# Patient Record
Sex: FEMALE | Race: BLACK OR AFRICAN AMERICAN | Employment: PART TIME | URBAN - METROPOLITAN AREA
[De-identification: names, ages, dates, MRNs, and addresses within clinical notes are randomized per-mention and may not be internally consistent; named-entity substitution may affect disease eponyms.]

---

## 2018-12-06 ENCOUNTER — OFFICE VISIT (OUTPATIENT)
Dept: OBGYN CLINIC | Age: 38
End: 2018-12-06

## 2018-12-06 VITALS
RESPIRATION RATE: 20 BRPM | HEIGHT: 59 IN | SYSTOLIC BLOOD PRESSURE: 119 MMHG | HEART RATE: 77 BPM | BODY MASS INDEX: 26.61 KG/M2 | WEIGHT: 132 LBS | DIASTOLIC BLOOD PRESSURE: 66 MMHG

## 2018-12-06 DIAGNOSIS — B96.89 BV (BACTERIAL VAGINOSIS): ICD-10-CM

## 2018-12-06 DIAGNOSIS — Z01.411 ENCOUNTER FOR GYNECOLOGICAL EXAMINATION WITH ABNORMAL FINDING: Primary | ICD-10-CM

## 2018-12-06 DIAGNOSIS — N89.8 VAGINAL DISCHARGE: ICD-10-CM

## 2018-12-06 DIAGNOSIS — N76.0 BV (BACTERIAL VAGINOSIS): ICD-10-CM

## 2018-12-06 LAB — WET MOUNT POCT, WMPOCT: NORMAL

## 2018-12-06 RX ORDER — METRONIDAZOLE 500 MG/1
500 TABLET ORAL 2 TIMES DAILY
Qty: 14 TAB | Refills: 0 | Status: SHIPPED | OUTPATIENT
Start: 2018-12-06 | End: 2018-12-13

## 2018-12-06 NOTE — PROGRESS NOTES
Subjective:   41 y.o. female for Well Woman Check. Patient's last menstrual period was 11/13/2018. Social History: not sexually active. Pertinent past medical hstory: no history of HTN, DVT, CAD, DM, liver disease, migraines or smoking. Current Outpatient Medications   Medication Sig Dispense Refill    metroNIDAZOLE (FLAGYL) 500 mg tablet Take 1 Tab by mouth two (2) times a day for 7 days. 14 Tab 0     No Known Allergies  Past Medical History:   Diagnosis Date    Anemia     Depression     Headache     Hypertension      History reviewed. No pertinent surgical history. Family History   Problem Relation Age of Onset    Hypertension Mother     Arthritis-osteo Mother     Arthritis-osteo Father     Diabetes Father      Social History     Tobacco Use    Smoking status: Never Smoker   Substance Use Topics    Alcohol use: No        ROS:  Feeling well. No dyspnea or chest pain on exertion. No abdominal pain, change in bowel habits, black or bloody stools. No urinary tract symptoms. GYN ROS: normal menses, no abnormal bleeding or pelvic pain , no breast pain or new or enlarging lumps on self exam. She complains of an abnormal vaginal discharge. No neurological complaints. Objective:     Visit Vitals  /66   Pulse 77   Resp 20   Ht 4' 11\" (1.499 m)   Wt 132 lb (59.9 kg)   LMP 11/13/2018   BMI 26.66 kg/m²     The patient appears well, alert, oriented x 3, in no distress. ENT normal.  Neck supple. No adenopathy or thyromegaly. ELIDIA. Lungs are clear, good air entry, no wheezes, rhonchi or rales. S1 and S2 normal, no murmurs, regular rate and rhythm. Abdomen soft without tenderness, guarding, mass or organomegaly. Extremities show no edema, normal peripheral pulses. Neurological is normal, no focal findings.     BREAST EXAM: breasts appear normal, no suspicious masses, no skin or nipple changes or axillary nodes    PELVIC EXAM: normal external genitalia, vulva, vagina, cervix, uterus and adnexa, PAP: Pap smear done today, HPV test    Wet prep: positive clue cells, no yeast, no trich, minimal WBCs    Assessment/Plan:   well woman  Vaginal discharge, BV. Rx Flagyl. pap smear  return annually or prn  Plan of care discussed. Patient expressed understanding.

## 2018-12-07 LAB
HPV HIGH RISK, 507303: NEGATIVE
PAP IMAGE GUIDED, 8900296: NORMAL

## 2018-12-20 ENCOUNTER — TELEPHONE (OUTPATIENT)
Dept: OBGYN CLINIC | Age: 38
End: 2018-12-20

## 2019-09-10 ENCOUNTER — OFFICE VISIT (OUTPATIENT)
Dept: OBGYN CLINIC | Age: 39
End: 2019-09-10

## 2019-09-10 VITALS
WEIGHT: 158 LBS | SYSTOLIC BLOOD PRESSURE: 102 MMHG | HEART RATE: 70 BPM | BODY MASS INDEX: 31.85 KG/M2 | HEIGHT: 59 IN | DIASTOLIC BLOOD PRESSURE: 78 MMHG

## 2019-09-10 DIAGNOSIS — Z86.19 HISTORY OF PCR DNA POSITIVE FOR HSV1: Primary | ICD-10-CM

## 2019-09-10 RX ORDER — HYDROCORTISONE 25 MG/G
OINTMENT TOPICAL
Refills: 3 | COMMUNITY
Start: 2019-08-28 | End: 2022-02-08

## 2019-09-10 RX ORDER — LANOLIN ALCOHOL/MO/W.PET/CERES
325 CREAM (GRAM) TOPICAL
COMMUNITY
Start: 2018-08-23 | End: 2022-02-08

## 2019-09-10 RX ORDER — HYDROCORTISONE 1 %
CREAM (GRAM) TOPICAL
COMMUNITY
Start: 2017-06-17 | End: 2022-02-08

## 2019-09-10 RX ORDER — ASPIRIN 325 MG
50000 TABLET, DELAYED RELEASE (ENTERIC COATED) ORAL
COMMUNITY
Start: 2018-08-23 | End: 2022-02-08

## 2019-09-10 RX ORDER — VALACYCLOVIR HYDROCHLORIDE 1 G/1
1000 TABLET, FILM COATED ORAL DAILY
Qty: 90 TAB | Refills: 3 | Status: SHIPPED | OUTPATIENT
Start: 2019-09-10 | End: 2022-03-28 | Stop reason: SDUPTHER

## 2019-09-10 RX ORDER — VALACYCLOVIR HYDROCHLORIDE 1 G/1
1000 TABLET, FILM COATED ORAL DAILY
COMMUNITY
End: 2019-09-10 | Stop reason: SDUPTHER

## 2019-09-10 RX ORDER — HYDROCORTISONE 25 MG/G
OINTMENT TOPICAL
COMMUNITY
Start: 2018-11-13 | End: 2022-02-08 | Stop reason: SDUPTHER

## 2019-09-10 RX ORDER — SERTRALINE HYDROCHLORIDE 100 MG/1
100 TABLET, FILM COATED ORAL DAILY
Refills: 3 | COMMUNITY
Start: 2019-06-03 | End: 2022-02-08

## 2019-09-10 NOTE — PROGRESS NOTES
Patient presents only to request a refill of her Valtrex. She takes 1000 mg daily for suppression and states that she has no outbreaks if she stays on the medication. She has no complaints today.

## 2021-03-04 NOTE — TELEPHONE ENCOUNTER
Guillaume Washburn called because she received a letter in the mail about her pap results . Her appointment was December 6, 2018 . She wants to discuss her results . Please advise. Thank you . yes

## 2022-02-07 NOTE — PROGRESS NOTES
02/07/22          ICD-10-CM ICD-9-CM    1. Depression, unspecified depression type  F32. A 311 escitalopram oxalate (LEXAPRO) 5 mg tablet   2. 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 We will trial Lexapro 5 mg daily. Eczema Hytone cream ordered. Recheck in 3 months. Lab results and schedule of future lab studies reviewed with patient  Diagnostic and radiologic results and the schedule of future studies were reviewed with the patient  All questions were answered and understood. Subjective:   Marjan Mondragon is a 46 y.o. female has Previous stillbirth or demise, antepartum, H/O cervical incompetence, and ASCUS on Pap smear on their problem list..  Chief Complaint   Patient presents with    [facility]     HX of Depression     Medication Refill   Facial scaling. She has had almost lifelong scaling of the face and sometimes in the flexural areas of the arms. Treating the facial area eczema with Hytone cream and this is successful. No other aggravating or relieving factors. Depression  Patient occasionally feels down and a motivational although she is able to go to her job without difficulty. She occasionally smokes marijuana and this helps. No SI or HI admitted to. No med two years. Feels a little depresseion    Review of Systems   Constitutional: Negative for chills and fever. HENT: Negative for hearing loss. Eyes: Negative for blurred vision, discharge and redness. Respiratory: Negative for cough and shortness of breath. Cardiovascular: Negative for chest pain, palpitations, orthopnea and leg swelling. Gastrointestinal: Negative for abdominal pain, blood in stool, constipation, diarrhea and melena. Genitourinary: Negative for dysuria, frequency and urgency. Musculoskeletal: Negative for joint pain and myalgias.    Skin: Negative for rash. Neurological: Negative for dizziness, focal weakness and headaches. Psychiatric/Behavioral: Positive for depression. Negative for substance abuse and suicidal ideas. The patient is not nervous/anxious. Health Maintenance Due   Topic Date Due    Hepatitis C Screening  Never done    Depression Screen  Never done    COVID-19 Vaccine (1) Never done    DTaP/Tdap/Td series (1 - Tdap) Never done    Flu Vaccine (1) Never done     Marijuana use. Non-smoker nondrinker.  4 para 3 age 8, 10 and 7. First child stillborn. Current Outpatient Medications   Medication Sig    hydrocortisone (HYTONE) 2.5 % ointment Apply  to affected area.  valACYclovir (VALTREX) 1 gram tablet Take 1 Tab by mouth daily. No current facility-administered medications for this visit. No Known Allergies  has Previous stillbirth or demise, antepartum, H/O cervical incompetence, and ASCUS on Pap smear on their problem list.    No past surgical history on file. reports that she has never smoked. She has never used smokeless tobacco. She reports that she does not drink alcohol and does not use drugs. family history includes Diabetes in her father; Hypertension in her mother; OSTEOARTHRITIS in her father and mother. Visit Vitals  /80 (BP 1 Location: Left arm, BP Patient Position: Sitting, BP Cuff Size: Large adult)   Pulse 72   Temp 97.4 °F (36.3 °C) (Temporal)   Resp 18   Ht 4' 11\" (1.499 m)   Wt 153 lb (69.4 kg)   SpO2 98%   BMI 30.90 kg/m²       Physical Exam  Vitals and nursing note reviewed. Constitutional:       Appearance: She is well-developed. HENT:      Head: Normocephalic and atraumatic. Right Ear: External ear normal.      Left Ear: Tympanic membrane and external ear normal.      Nose: Nose normal.   Eyes:      Conjunctiva/sclera: Conjunctivae normal.      Pupils: Pupils are equal, round, and reactive to light. Cardiovascular:      Rate and Rhythm: Regular rhythm. Heart sounds: Normal heart sounds. Pulmonary:      Effort: Pulmonary effort is normal. No respiratory distress. Breath sounds: Normal breath sounds. No wheezing or rales. Chest:      Chest wall: No tenderness. Abdominal:      General: Bowel sounds are normal. There is no distension. Palpations: Abdomen is soft. There is no mass. Tenderness: There is no abdominal tenderness. There is no guarding or rebound. Musculoskeletal:         General: Normal range of motion. Cervical back: Normal range of motion and neck supple. Skin:     General: Skin is warm and dry. Findings: No erythema or rash (Mild malar scaling. ). Neurological:      Mental Status: She is alert and oriented to person, place, and time. Motor: No weakness. Gait: Gait normal.   Psychiatric:         Mood and Affect: Mood normal.         Behavior: Behavior normal.         Judgment: Judgment normal.            Results for orders placed or performed in visit on 12/06/18   AMB POC SMEAR, STAIN & INTERPRET, WET MOUNT   Result Value Ref Range    Wet mount (POC)     HPV HIGH RISK, THIN PREP   Result Value Ref Range    HPV DNA Probe, High Risk Negative Negative   PAP IG (IMAGE GUIDED)   Result Value Ref Range    PAP IMAGE GUIDED           We discussed the expected course, resolution and complications of the diagnosis(es) in detail. Medication risks, benefits, costs, interactions, and alternatives were discussed as indicated. I advised her to contact the office if her condition worsens, changes or fails to improve as anticipated. She expressed understanding with the diagnosis(es) and plan. This note was done with the assistance of dragon speech software.   Some inadvertent errors or omissions may be present

## 2022-02-08 ENCOUNTER — OFFICE VISIT (OUTPATIENT)
Dept: FAMILY MEDICINE CLINIC | Age: 42
End: 2022-02-08
Payer: MEDICAID

## 2022-02-08 VITALS
HEART RATE: 72 BPM | WEIGHT: 153 LBS | OXYGEN SATURATION: 98 % | DIASTOLIC BLOOD PRESSURE: 80 MMHG | BODY MASS INDEX: 30.84 KG/M2 | SYSTOLIC BLOOD PRESSURE: 139 MMHG | RESPIRATION RATE: 18 BRPM | TEMPERATURE: 97.4 F | HEIGHT: 59 IN

## 2022-02-08 DIAGNOSIS — F32.A DEPRESSION, UNSPECIFIED DEPRESSION TYPE: Primary | ICD-10-CM

## 2022-02-08 DIAGNOSIS — L30.8 OTHER ECZEMA: ICD-10-CM

## 2022-02-08 PROCEDURE — 99202 OFFICE O/P NEW SF 15 MIN: CPT | Performed by: EMERGENCY MEDICINE

## 2022-02-08 RX ORDER — HYDROCORTISONE 25 MG/G
OINTMENT TOPICAL 2 TIMES DAILY
Qty: 30 G | Refills: 2 | Status: SHIPPED | OUTPATIENT
Start: 2022-02-08 | End: 2022-07-25 | Stop reason: SDUPTHER

## 2022-02-08 RX ORDER — ESCITALOPRAM OXALATE 5 MG/1
5 TABLET ORAL DAILY
Qty: 30 TABLET | Refills: 2 | Status: SHIPPED | OUTPATIENT
Start: 2022-02-08 | End: 2022-05-05

## 2022-02-08 NOTE — PATIENT INSTRUCTIONS
Depression and Chronic Disease: Care Instructions  Your Care Instructions     A chronic disease is one that you have for a long time. Some chronic diseases can be controlled, but they usually cannot be cured. Depression is common in people with chronic diseases, but it often goes unnoticed. Many people have concerns about seeking treatment for a mental health problem. You may think it's a sign of weakness, or you don't want people to know about it. It's important to overcome these reasons for not seeking treatment. Treating depression or anxiety is good for your health. Follow-up care is a key part of your treatment and safety. Be sure to make and go to all appointments, and call your doctor if you are having problems. It's also a good idea to know your test results and keep a list of the medicines you take. How can you care for yourself at home? Watch for symptoms of depression  The symptoms of depression are often subtle at first. You may think they are caused by your disease rather than depression. Or you may think it is normal to be depressed when you have a chronic disease. If you are depressed you may:  · Feel sad or hopeless. · Feel guilty or worthless. · Not enjoy the things you used to enjoy. · Feel hopeless, as though life is not worth living. · Have trouble thinking or remembering. · Have low energy, and you may not eat or sleep well. · Pull away from others. · Think often about death or killing yourself. (Keep the numbers for these national suicide hotlines: 1-473-633-TALK [1-222.841.1625] and 4-940-YUCBSME [1-271.260.4128]. )  Get treatment  By treating your depression, you can feel more hopeful and have more energy. If you feel better, you may take better care of yourself, so your health may improve. · Talk to your doctor if you have any changes in mood during treatment for your disease. · Ask your doctor for help.  Counseling, antidepressant medicine, or a combination of the two can help most people with depression. Often a combination works best. Counseling can also help you cope with having a chronic disease. When should you call for help? Call 911 anytime you think you may need emergency care. For example, call if:    · You feel like hurting yourself or someone else.     · Someone you know has depression and is about to attempt or is attempting suicide. Call your doctor now or seek immediate medical care if:    · You hear voices.     · Someone you know has depression and:  ? Starts to give away his or her possessions. ? Uses illegal drugs or drinks alcohol heavily. ? Talks or writes about death, including writing suicide notes or talking about guns, knives, or pills. ? Starts to spend a lot of time alone. ? Acts very aggressively or suddenly appears calm. Watch closely for changes in your health, and be sure to contact your doctor if:    · You do not get better as expected. Where can you learn more? Go to http://www.gray.com/  Enter A548 in the search box to learn more about \"Depression and Chronic Disease: Care Instructions. \"  Current as of: June 16, 2021               Content Version: 13.0  © 7597-5831 [employer]. Care instructions adapted under license by [employer] (which disclaims liability or warranty for this information). If you have questions about a medical condition or this instruction, always ask your healthcare professional. [place] [address] any warranty or liability for your use of this information. Dermatitis: Care Instructions  Your Care Instructions  Dermatitis is the general name used for any rash or inflammation of the skin. Different kinds of dermatitis cause different kinds of rashes. Common causes of a rash include new medicines, plants (such as poison oak or poison ivy), heat, and stress. Certain illnesses can also cause a rash.   An allergic reaction to something that touches your skin, such as latex, nickel, or poison ivy, is called contact dermatitis. Contact dermatitis may also be caused by something that irritates the skin, such as bleach, a chemical, or soap. These types of rashes cannot be spread from person to person. How long your rash will last depends on what caused it. Rashes may last a few days or months. Follow-up care is a key part of your treatment and safety. Be sure to make and go to all appointments, and call your doctor if you are having problems. It's also a good idea to know your test results and keep a list of the medicines you take. How can you care for yourself at home? · Do not scratch the rash. Cut your nails short, and file them smooth. Or wear gloves if this helps keep you from scratching. · Wash the area with water only. Pat dry. · Put cold, wet cloths on the rash to reduce itching. · Keep cool, and stay out of the sun. · Leave the rash open to the air as much as possible. · If the rash itches, use hydrocortisone cream. Follow the directions on the label. Calamine lotion may help for plant rashes. · Take an over-the-counter antihistamine, such as diphenhydramine (Benadryl) or loratadine (Claritin), to help calm the itching. Read and follow all instructions on the label. · If your doctor prescribed a cream, use it as directed. If your doctor prescribed medicine, take it exactly as directed. When should you call for help? Call your doctor now or seek immediate medical care if:    · You have symptoms of infection, such as:  ? Increased pain, swelling, warmth, or redness. ? Red streaks leading from the area. ? Pus draining from the area. ? A fever.     · You have joint pain along with the rash. Watch closely for changes in your health, and be sure to contact your doctor if:    · Your rash is changing or getting worse.     · You are not getting better as expected. Where can you learn more?   Go to http://www.gray.com/  Enter F270 in the search box to learn more about \"Dermatitis: Care Instructions. \"  Current as of: March 3, 2021               Content Version: 13.0  © 1588-4528 [employer]. Care instructions adapted under license by [employer] (which disclaims liability or warranty for this information). If you have questions about a medical condition or this instruction, always ask your healthcare professional. [place] [address] any warranty or liability for your use of this information.

## 2022-02-08 NOTE — PROGRESS NOTES
Melonie Younger is a 46 y.o. female that is here for a   Chief Complaint   Patient presents with    Establish Care     HX of Depression     Medication Refill         1. Have you been to the ER, urgent care clinic since your last visit? Hospitalized since your last visit?no    2. Have you seen or consulted any other health care providers outside of the [address] [place] [address] since your last visit? Include any pap smears or colon screening.  no      Health Maintenance reviewed - yes      Upcoming Appts  no      VORB: No orders of the defined types were placed in this encounter.   Monica Magaña MD/ Yunior Alvarez MA

## 2022-03-28 RX ORDER — VALACYCLOVIR HYDROCHLORIDE 1 G/1
1000 TABLET, FILM COATED ORAL DAILY
Qty: 90 TABLET | Refills: 3 | Status: SHIPPED | OUTPATIENT
Start: 2022-03-28 | End: 2022-07-25 | Stop reason: SDUPTHER

## 2022-07-23 NOTE — PROGRESS NOTES
07/25/22          ICD-10-CM ICD-9-CM    1. Depression, unspecified depression type  F32. A 311       2. Other eczema  L30.8 692.9 hydrocortisone (HYTONE) 2.5 % ointment      3. Encounter for hepatitis C screening test for low risk patient  Z11.59 V73.89 HEPATITIS C AB      4. Encounter for immunization  Z23 V03.89 PNEUMOCOCCAL, PCV20, PREVNAR 20, (AGE 18 YRS+), IM, PF      pneumococcal 23-valent (PNEUMOVAX 23) 25 mcg/0.5 mL injection      diphth,pertus,acell,,tetanus (BOOSTRIX TDAP) 2.5-8-5 Lf-mcg-Lf/0.5mL susp suspension      5. Screening for cholesterol level  Z13.220 V77.91 LIPID PANEL      6. Sprain of metacarpophalangeal (MCP) joint of left thumb, initial encounter  S63.642A 842.12       7. Acute pain of both knees  M25.561 338.19 diclofenac (VOLTAREN) 1 % gel    M25.562 719.46       8. Genital herpes simplex, unspecified site  A60.00 054.10 valACYclovir (VALTREX) 1 gram tablet        Follow-up and Dispositions    Return in about 4 months (around 11/25/2022) for Follow up on illness, Labs/diagnostics/referrals ordered this visit. Assessment and plan  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 Significant improvement with the Lexapro 5 mg daily. Eczema Hytone cream continued  Knee pain bilaterally. Tried Voltaren gel. Left thumb pain. May be de Quervain's tenosynovitis. Has to wear splint for 2 weeks and use the Voltaren gel. Valtrex for herpes genitalis. Screen for elevated cholesterol today. Agrees to have vaccines. Recheck in 3 months. Lab results and schedule of future lab studies reviewed with patient  Diagnostic and radiologic results and the schedule of future studies were reviewed with the patient  All questions were answered and understood.   Health Maintenance Due   Topic Date Due    Hepatitis C Screening  Never done    Pneumococcal 0-64 years (1 - PCV) Never done    DTaP/Tdap/Td series (1 - Tdap) Never done    COVID-19 Vaccine (2 - Pfizer series) 02/10/2022    Lipid Screen  Never done     Marijuana use. Non-smoker nondrinker.  4 para 3 age 8, 10 and 7. First child stillborn. Subjective:   Weston Gonsalez is a 38 y.o. female has Previous stillbirth or demise, antepartum, H/O cervical incompetence, and ASCUS on Pap smear on their problem list..  Chief Complaint   Patient presents with    Follow-up    Knee Pain           Request For New Medication           Finger Swelling          Seen previously by our practice on 2022. Demetrio Olea No labs to review. 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 We will trial Lexapro 5 mg daily. Eczema Hytone cream ordered. Left thumb pain  Lifting client two weeks prior  Also from a cut. Knee pain bilaterally  Using mom's muscle relaxer and Tylenol. Facial scaling. She has had almost lifelong scaling of the face and sometimes in the flexural areas of the arms. Treating the facial area eczema with Hytone cream and this is successful. No other aggravating or relieving factors. Depression  Patient occasionally feels down and a motivational although she is able to go to her job without difficulty. She occasionally smokes marijuana and this helps. No SI or HI admitted to. No med two years. Better post med use. Review of Systems   Constitutional:  Negative for chills and fever. HENT:  Negative for hearing loss. Cracked tooth   Eyes:  Negative for blurred vision, discharge and redness. Respiratory:  Negative for cough and shortness of breath. Cardiovascular:  Negative for chest pain, palpitations, orthopnea and leg swelling. Gastrointestinal:  Negative for abdominal pain, blood in stool, constipation, diarrhea and melena. Genitourinary:  Negative for dysuria, frequency and urgency. Musculoskeletal:  Negative for joint pain and myalgias. Skin:  Negative for rash.    Neurological:  Negative for dizziness, focal weakness and headaches. Psychiatric/Behavioral:  Positive for depression. Negative for substance abuse and suicidal ideas. The patient is not nervous/anxious. Current Outpatient Medications   Medication Sig    pneumococcal 23-valent (PNEUMOVAX 23) 25 mcg/0.5 mL injection 0.5 mL by IntraMUSCular route once for 1 dose. Indications: prevention of Streptococcus pneumoniae infection    diphth,pertus,acell,,tetanus (BOOSTRIX TDAP) 2.5-8-5 Lf-mcg-Lf/0.5mL susp suspension 0.5 mL by IntraMUSCular route once for 1 dose. valACYclovir (VALTREX) 1 gram tablet Take 1 Tablet by mouth in the morning. hydrocortisone (HYTONE) 2.5 % ointment Apply  to affected area two (2) times a day. diclofenac (VOLTAREN) 1 % gel Apply 2 g to affected area four (4) times daily. escitalopram oxalate (LEXAPRO) 5 mg tablet TAKE 1 TABLET BY MOUTH DAILY     No current facility-administered medications for this visit. Not on File  has Previous stillbirth or demise, antepartum, H/O cervical incompetence, and ASCUS on Pap smear on their problem list.    History reviewed. No pertinent surgical history. reports that she has been smoking cigarettes. She has never used smokeless tobacco. She reports current drug use. Drug: Marijuana. She reports that she does not drink alcohol.  family history includes Diabetes in her father; Hypertension in her mother; OSTEOARTHRITIS in her father and mother. Visit Vitals  /70 (BP 1 Location: Left arm, BP Patient Position: Sitting, BP Cuff Size: Adult long)   Pulse 79   Temp 97.6 °F (36.4 °C) (Oral)   Resp 18   Ht 4' 11\" (1.499 m)   Wt 152 lb (68.9 kg)   SpO2 98%   BMI 30.70 kg/m²       Physical Exam  Vitals and nursing note reviewed. Constitutional:       Appearance: She is well-developed. HENT:      Head: Normocephalic and atraumatic.       Right Ear: Tympanic membrane and external ear normal.      Left Ear: Tympanic membrane and external ear normal.      Nose: Nose normal. Mouth/Throat:      Mouth: Mucous membranes are moist.      Comments: 1 partial right lower molar perhaps #15 or 16 partially cracked. Eyes:      Conjunctiva/sclera: Conjunctivae normal.      Pupils: Pupils are equal, round, and reactive to light. Cardiovascular:      Rate and Rhythm: Regular rhythm. Heart sounds: Normal heart sounds. Pulmonary:      Effort: Pulmonary effort is normal. No respiratory distress. Breath sounds: Normal breath sounds. No wheezing or rales. Chest:      Chest wall: No tenderness. Abdominal:      General: Bowel sounds are normal. There is no distension. Palpations: Abdomen is soft. There is no mass. Tenderness: There is no abdominal tenderness. There is no guarding or rebound. Musculoskeletal:         General: Tenderness (Base of left thumb. Positive snuffbox tenderness. Pain on attempted abduction against pressure.) present. Normal range of motion. Cervical back: Normal range of motion and neck supple. Skin:     General: Skin is warm and dry. Findings: No erythema or rash (Mild malar scaling.). Comments: Bilateral knee pain on pressure against the patella. Good stability. Neurological:      Mental Status: She is alert and oriented to person, place, and time. Motor: No weakness. Gait: Gait normal.   Psychiatric:         Mood and Affect: Mood normal.         Behavior: Behavior normal.         Judgment: Judgment normal.          Results for orders placed or performed in visit on 12/06/18   AMB POC SMEAR, STAIN & INTERPRET, WET MOUNT   Result Value Ref Range    Wet mount (POC)     HPV HIGH RISK, THIN PREP   Result Value Ref Range    HPV DNA Probe, High Risk Negative Negative   PAP IG (IMAGE GUIDED)   Result Value Ref Range    PAP IMAGE GUIDED           We discussed the expected course, resolution and complications of the diagnosis(es) in detail.   Medication risks, benefits, costs, interactions, and alternatives were discussed as indicated. I advised her to contact the office if her condition worsens, changes or fails to improve as anticipated. She expressed understanding with the diagnosis(es) and plan. This note was done with the assistance of dragon speech software.   Some inadvertent errors or omissions may be present

## 2022-07-25 ENCOUNTER — OFFICE VISIT (OUTPATIENT)
Dept: FAMILY MEDICINE CLINIC | Age: 42
End: 2022-07-25
Payer: MEDICAID

## 2022-07-25 VITALS
SYSTOLIC BLOOD PRESSURE: 122 MMHG | RESPIRATION RATE: 18 BRPM | HEART RATE: 79 BPM | BODY MASS INDEX: 30.64 KG/M2 | WEIGHT: 152 LBS | OXYGEN SATURATION: 98 % | HEIGHT: 59 IN | TEMPERATURE: 97.6 F | DIASTOLIC BLOOD PRESSURE: 70 MMHG

## 2022-07-25 DIAGNOSIS — A60.00 GENITAL HERPES SIMPLEX, UNSPECIFIED SITE: ICD-10-CM

## 2022-07-25 DIAGNOSIS — F32.A DEPRESSION, UNSPECIFIED DEPRESSION TYPE: Primary | ICD-10-CM

## 2022-07-25 DIAGNOSIS — L30.8 OTHER ECZEMA: ICD-10-CM

## 2022-07-25 DIAGNOSIS — M25.561 ACUTE PAIN OF BOTH KNEES: ICD-10-CM

## 2022-07-25 DIAGNOSIS — Z13.220 SCREENING FOR CHOLESTEROL LEVEL: ICD-10-CM

## 2022-07-25 DIAGNOSIS — Z11.59 ENCOUNTER FOR HEPATITIS C SCREENING TEST FOR LOW RISK PATIENT: ICD-10-CM

## 2022-07-25 DIAGNOSIS — S63.642A SPRAIN OF METACARPOPHALANGEAL (MCP) JOINT OF LEFT THUMB, INITIAL ENCOUNTER: ICD-10-CM

## 2022-07-25 DIAGNOSIS — M25.562 ACUTE PAIN OF BOTH KNEES: ICD-10-CM

## 2022-07-25 DIAGNOSIS — Z23 ENCOUNTER FOR IMMUNIZATION: ICD-10-CM

## 2022-07-25 PROCEDURE — 99214 OFFICE O/P EST MOD 30 MIN: CPT | Performed by: EMERGENCY MEDICINE

## 2022-07-25 RX ORDER — VALACYCLOVIR HYDROCHLORIDE 1 G/1
1000 TABLET, FILM COATED ORAL DAILY
Qty: 90 TABLET | Refills: 3 | Status: SHIPPED | OUTPATIENT
Start: 2022-07-25

## 2022-07-25 RX ORDER — DICLOFENAC SODIUM 10 MG/G
2 GEL TOPICAL 4 TIMES DAILY
Qty: 100 G | Refills: 3 | Status: SHIPPED | OUTPATIENT
Start: 2022-07-25 | End: 2022-10-14

## 2022-07-25 RX ORDER — HYDROCORTISONE 25 MG/G
OINTMENT TOPICAL 2 TIMES DAILY
Qty: 30 G | Refills: 2 | Status: SHIPPED | OUTPATIENT
Start: 2022-07-25

## 2022-07-25 NOTE — PATIENT INSTRUCTIONS
Knee Pain or Injury: Care Instructions  Your Care Instructions     Injuries are a common cause of knee problems. Sudden (acute) injuries may be caused by a direct blow to the knee. They can also be caused by abnormal twisting, bending, or falling on the knee. Pain, bruising, or swelling may be severe, and may start within minutes of the injury. Overuse is another cause of knee pain. Other causes are climbing stairs, kneeling, and other activities that use the knee. Everyday wear and tear, especially as you get older, also can cause knee pain. Rest, along with home treatment, often relieves pain and allows your knee to heal. If you have a serious knee injury, you may need tests and treatment. Follow-up care is a key part of your treatment and safety. Be sure to make and go to all appointments, and call your doctor if you are having problems. It's also a good idea to know your test results and keep a list of the medicines you take. How can you care for yourself at home? Be safe with medicines. Read and follow all instructions on the label. If the doctor gave you a prescription medicine for pain, take it as prescribed. If you are not taking a prescription pain medicine, ask your doctor if you can take an over-the-counter medicine. Rest and protect your knee. Take a break from any activity that may cause pain. Put ice or a cold pack on your knee for 10 to 20 minutes at a time. Put a thin cloth between the ice and your skin. Prop up a sore knee on a pillow when you ice it or anytime you sit or lie down for the next 3 days. Try to keep it above the level of your heart. This will help reduce swelling. If your knee is not swollen, you can put moist heat, a heating pad, or a warm cloth on your knee. If your doctor recommends an elastic bandage, sleeve, or other type of support for your knee, wear it as directed. Follow your doctor's instructions about how much weight you can put on your leg.  Use a cane, crutches, or a walker as instructed. Follow your doctor's instructions about activity during your healing process. If you can do mild exercise, slowly increase your activity. Reach and stay at a healthy weight. Extra weight can strain the joints, especially the knees and hips, and make the pain worse. Losing even a few pounds may help. When should you call for help? Call 911 anytime you think you may need emergency care. For example, call if:    You have symptoms of a blood clot in your lung (called a pulmonary embolism). These may include:  Sudden chest pain. Trouble breathing. Coughing up blood. Call your doctor now or seek immediate medical care if:    You have severe or increasing pain. Your leg or foot turns cold or changes color. You cannot stand or put weight on your knee. Your knee looks twisted or bent out of shape. You cannot move your knee. You have signs of infection, such as: Increased pain, swelling, warmth, or redness. Red streaks leading from the knee. Pus draining from a place on your knee. A fever. You have signs of a blood clot in your leg (called a deep vein thrombosis), such as:  Pain in your calf, back of the knee, thigh, or groin. Redness and swelling in your leg or groin. Watch closely for changes in your health, and be sure to contact your doctor if:    You have tingling, weakness, or numbness in your knee. You have any new symptoms, such as swelling. You have bruises from a knee injury that last longer than 2 weeks. You do not get better as expected. Where can you learn more? Go to http://www.gray.com/  Enter K195 in the search box to learn more about \"Knee Pain or Injury: Care Instructions. \"  Current as of: July 1, 2021               Content Version: 13.2  © 2006-2022 [employer].    Care instructions adapted under license by [employer] (which disclaims liability or warranty for this information). If you have questions about a medical condition or this instruction, always ask your healthcare professional. [address] any warranty or liability for your use of this information. Depression and Chronic Disease: Care Instructions  Your Care Instructions     A chronic disease is one that you have for a long time. Some chronic diseases can be controlled, but they usually cannot be cured. Depression is common in people with chronic diseases, but it often goes unnoticed. Many people have concerns about seeking treatment for a mental health problem. You may think it's a sign of weakness, or you don't want people to know about it. It's important to overcome these reasons for not seeking treatment. Treating depression or anxiety is good for your health. Follow-up care is a key part of your treatment and safety. Be sure to make and go to all appointments, and call your doctor if you are having problems. It's also a good idea to know your test results and keep a list of the medicines you take. How can you care for yourself at home? Watch for symptoms of depression  The symptoms of depression are often subtle at first. You may think they are caused by your disease rather than depression. Or you may think it is normal to be depressed when you have a chronic disease. If you are depressed you may:  Feel sad or hopeless. Feel guilty or worthless. Not enjoy the things you used to enjoy. Feel hopeless, as though life is not worth living. Have trouble thinking or remembering. Have low energy, and you may not eat or sleep well. Pull away from others. Think often about death or killing yourself. (Keep the numbers for these national suicide hotlines: 7-305-761-TALK [1-508.680.4764] and 0-206-ZFKHROK [1-288.186.2761]. )  Get treatment  By treating your depression, you can feel more hopeful and have more energy.  If you feel better, you may take better care of yourself, so your health may improve. Talk to your doctor if you have any changes in mood during treatment for your disease. Ask your doctor for help. Counseling, antidepressant medicine, or a combination of the two can help most people with depression. Often a combination works best. Counseling can also help you cope with having a chronic disease. When should you call for help? Call 911 anytime you think you may need emergency care. For example, call if:    You feel like hurting yourself or someone else. Someone you know has depression and is about to attempt or is attempting suicide. Call your doctor now or seek immediate medical care if:    You hear voices. Someone you know has depression and:  Starts to give away his or her possessions. Uses illegal drugs or drinks alcohol heavily. Talks or writes about death, including writing suicide notes or talking about guns, knives, or pills. Starts to spend a lot of time alone. Acts very aggressively or suddenly appears calm. Watch closely for changes in your health, and be sure to contact your doctor if:    You do not get better as expected. Where can you learn more? Go to http://www.gray.com/  Enter A548 in the search box to learn more about \"Depression and Chronic Disease: Care Instructions. \"  Current as of: June 16, 2021               Content Version: 13.2  © 1335-7167 [employer]. Care instructions adapted under license by [employer] (which disclaims liability or warranty for this information). If you have questions about a medical condition or this instruction, always ask your healthcare professional. [place] [address] any warranty or liability for your use of this information.

## 2022-07-25 NOTE — PROGRESS NOTES
1. \"Have you been to the ER, urgent care clinic since your last visit? Hospitalized since your last visit? \" No    2. \"Have you seen or consulted any other health care providers outside of the [address] [place] [address] since your last visit? \" No     3. For patients aged 39-70: Has the patient had a colonoscopy / FIT/ Cologuard? NA - based on age      If the patient is female:    4. For patients aged 41-77: Has the patient had a mammogram within the past 2 years? No      5. For patients aged 21-65: Has the patient had a pap smear?  No

## 2022-07-26 LAB
CHOLEST SERPL-MCNC: 192 MG/DL (ref 110–200)
HCV AB SER IA-ACNC: NORMAL
HDLC SERPL-MCNC: 3 MG/DL (ref 0–5)
HDLC SERPL-MCNC: 63 MG/DL
LDL/HDL RATIO,LDHD: 1.8
LDLC SERPL CALC-MCNC: 116 MG/DL (ref 50–99)
NON-HDL CHOLESTEROL, 011976: 129 MG/DL
TRIGL SERPL-MCNC: 63 MG/DL (ref 40–149)
VLDLC SERPL CALC-MCNC: 13 MG/DL (ref 8–30)

## 2022-07-27 PROBLEM — G43.109 MIGRAINE WITH AURA AND WITHOUT STATUS MIGRAINOSUS, NOT INTRACTABLE: Status: ACTIVE | Noted: 2018-08-22

## 2022-07-27 PROBLEM — B00.9 HERPES SIMPLEX: Status: ACTIVE | Noted: 2018-08-22

## 2022-07-27 PROBLEM — F32.0 CURRENT MILD EPISODE OF MAJOR DEPRESSIVE DISORDER WITHOUT PRIOR EPISODE (HCC): Status: ACTIVE | Noted: 2018-08-22

## 2022-08-03 ENCOUNTER — TRANSCRIBE ORDER (OUTPATIENT)
Dept: SCHEDULING | Age: 42
End: 2022-08-03

## 2022-08-03 DIAGNOSIS — Z12.31 VISIT FOR SCREENING MAMMOGRAM: Primary | ICD-10-CM

## 2022-08-04 NOTE — PROGRESS NOTES
Please call. Labs look good including negative hep C except the bad cholesterol is slightly high. Change to a low-fat diet with less red meat less concentrated sweets and it should be fine.

## 2022-08-31 DIAGNOSIS — F32.A DEPRESSION, UNSPECIFIED DEPRESSION TYPE: ICD-10-CM

## 2022-08-31 RX ORDER — ESCITALOPRAM OXALATE 5 MG/1
5 TABLET ORAL DAILY
Qty: 30 TABLET | Refills: 3 | Status: SHIPPED | OUTPATIENT
Start: 2022-08-31

## 2022-10-14 ENCOUNTER — APPOINTMENT (OUTPATIENT)
Dept: GENERAL RADIOLOGY | Age: 42
End: 2022-10-14
Attending: PHYSICIAN ASSISTANT
Payer: MEDICAID

## 2022-10-14 ENCOUNTER — HOSPITAL ENCOUNTER (EMERGENCY)
Age: 42
Discharge: HOME | End: 2022-10-14
Attending: EMERGENCY MEDICINE
Payer: MEDICAID

## 2022-10-14 VITALS
HEART RATE: 68 BPM | BODY MASS INDEX: 30.04 KG/M2 | OXYGEN SATURATION: 100 % | RESPIRATION RATE: 16 BRPM | WEIGHT: 149 LBS | SYSTOLIC BLOOD PRESSURE: 126 MMHG | TEMPERATURE: 99.2 F | HEIGHT: 59 IN | DIASTOLIC BLOOD PRESSURE: 90 MMHG

## 2022-10-14 DIAGNOSIS — S86.911A KNEE STRAIN, RIGHT, INITIAL ENCOUNTER: Primary | ICD-10-CM

## 2022-10-14 PROCEDURE — 74011250636 HC RX REV CODE- 250/636: Performed by: PHYSICIAN ASSISTANT

## 2022-10-14 PROCEDURE — 96372 THER/PROPH/DIAG INJ SC/IM: CPT

## 2022-10-14 PROCEDURE — 99284 EMERGENCY DEPT VISIT MOD MDM: CPT

## 2022-10-14 PROCEDURE — 73562 X-RAY EXAM OF KNEE 3: CPT

## 2022-10-14 RX ORDER — NAPROXEN 500 MG/1
500 TABLET ORAL 2 TIMES DAILY WITH MEALS
Qty: 20 TABLET | Refills: 0 | Status: SHIPPED | OUTPATIENT
Start: 2022-10-14 | End: 2022-10-24

## 2022-10-14 RX ORDER — METHOCARBAMOL 750 MG/1
750 TABLET, FILM COATED ORAL 3 TIMES DAILY
Qty: 30 TABLET | Refills: 0 | Status: SHIPPED | OUTPATIENT
Start: 2022-10-14 | End: 2022-10-24

## 2022-10-14 RX ORDER — KETOROLAC TROMETHAMINE 15 MG/ML
15 INJECTION, SOLUTION INTRAMUSCULAR; INTRAVENOUS
Status: COMPLETED | OUTPATIENT
Start: 2022-10-14 | End: 2022-10-14

## 2022-10-14 RX ADMIN — KETOROLAC TROMETHAMINE 15 MG: 15 INJECTION, SOLUTION INTRAMUSCULAR; INTRAVENOUS at 11:39

## 2022-10-14 NOTE — DISCHARGE INSTRUCTIONS
Apply ice. Keep elevated. Wear knee brace. Take medications as prescribed. F/U with orthopedics if no improvemen. t

## 2022-10-14 NOTE — ED PROVIDER NOTES
[facility] (DP/SNF)  Emergency Department Treatment Report        Patient: Truman Thomas Age: 38 y.o. Sex: female    YOB: 1980 Admit Date: 10/14/2022 PCP: Gumaro Yoon MD   MRN: [id number]  CSN: [id number]  Attending: Ann aPlacios MD      Room: [address] Time Dictated: 12:25 PM PA-C: FREDI Monk     Chief Complaint   RT knee pain    History of Present Illness   12:26 PM   38 y.o. female presents to the ED C/O RT knee pain. Pt reports that her RT knee has been hurting for the last week and it hurts to stand, walk and sleep. She has been applying Voltaren gel and Lidocaine patches to the knee and taking Tylenol with no relief. She hasn't had any injuries to the knee. Pt denies fever, chills, SOB, difficulty breathing, calf pain, redness, swelling, bruising, warmth, chets pain, back pain, or any other symptoms or complaints. PMHx: anemia, Depression, Headaches, and HTN. Review of Systems   Review of Systems   Constitutional:  Negative for appetite change, chills and fever. HENT:  Negative for congestion, ear pain and sore throat. Eyes:  Negative for discharge and redness. Respiratory:  Negative for cough, chest tightness, shortness of breath and wheezing. Cardiovascular:  Negative for chest pain. Gastrointestinal:  Negative for abdominal pain, constipation, diarrhea, nausea and vomiting. Endocrine: Negative for polyuria. Genitourinary:  Negative for dysuria, frequency, hematuria, pelvic pain, urgency, vaginal bleeding, vaginal discharge and vaginal pain. Musculoskeletal:  Positive for arthralgias. Negative for back pain, joint swelling and neck pain. RT knee pain   Skin:  Negative for rash and wound. No breaks in skin   Allergic/Immunologic: Negative for immunocompromised state. Neurological:  Negative for dizziness, syncope, weakness, light-headedness, numbness and headaches.         No numbness/tingling   Hematological:  Negative for adenopathy. Psychiatric/Behavioral:  Negative for agitation and confusion. The patient is not nervous/anxious. Past Medical/Surgical History     Past Medical History:   Diagnosis Date    Anemia     Depression     Headache     Hypertension      No past surgical history on file. Social History     Social History     Socioeconomic History    Marital status: SINGLE     Spouse name: Not on file    Number of children: Not on file    Years of education: Not on file    Highest education level: Not on file   Occupational History    Not on file   Tobacco Use    Smoking status: Some Days     Types: Cigarettes     Last attempt to quit: 11/15/2003     Years since quittin.9    Smokeless tobacco: Never   Substance and Sexual Activity    Alcohol use: No    Drug use: Yes     Types: Marijuana    Sexual activity: Not Currently     Partners: Male     Birth control/protection: None   Other Topics Concern    Not on file   Social History Narrative    Not on file     Social Determinants of Health     Financial Resource Strain: Not on file   Food Insecurity: Not on file   Transportation Needs: Not on file   Physical Activity: Not on file   Stress: Not on file   Social Connections: Not on file   Intimate Partner Violence: Not on file   Housing Stability: Not on file       Family History     Family History   Problem Relation Age of Onset    Hypertension Mother     OSTEOARTHRITIS Mother     OSTEOARTHRITIS Father     Diabetes Father        Current Medications     Prior to Admission Medications   Prescriptions Last Dose Informant Patient Reported? Taking?   diclofenac (VOLTAREN) 1 % gel 10/13/2022  No Yes   Sig: Apply 2 g to affected area four (4) times daily. escitalopram oxalate (LEXAPRO) 5 mg tablet 10/13/2022  No Yes   Sig: TAKE 1 TABLET BY MOUTH DAILY   hydrocortisone (HYTONE) 2.5 % ointment 10/14/2022  No Yes   Sig: Apply  to affected area two (2) times a day.    valACYclovir (VALTREX) 1 gram tablet 10/14/2022  No Yes   Sig: Take 1 Tablet by mouth in the morning. Facility-Administered Medications: None       Allergies   No Known Allergies    Physical Exam   Patient Vitals for the past 8 hrs:   Temp Pulse Resp BP SpO2   10/14/22 0946 99.2 °F (37.3 °C) 68 16 (!) 126/90 100 %       Physical Exam  Vitals and nursing note reviewed. Constitutional:       General: She is not in acute distress. Appearance: She is well-developed. She is not diaphoretic. HENT:      Head: Normocephalic and atraumatic. Right Ear: External ear normal.      Left Ear: External ear normal.      Nose: Nose normal.      Mouth/Throat:      Pharynx: No oropharyngeal exudate. Eyes:      General:         Right eye: No discharge. Left eye: No discharge. Conjunctiva/sclera: Conjunctivae normal.   Cardiovascular:      Rate and Rhythm: Normal rate and regular rhythm. Heart sounds: Normal heart sounds. No murmur heard. No friction rub. No gallop. Pulmonary:      Effort: Pulmonary effort is normal. No respiratory distress. Breath sounds: Normal breath sounds. No wheezing or rales. Chest:      Chest wall: No tenderness. Abdominal:      General: Bowel sounds are normal. There is no distension. Palpations: Abdomen is soft. There is no mass. Tenderness: There is no abdominal tenderness. There is no guarding or rebound. Musculoskeletal:         General: Tenderness present. No swelling, deformity or signs of injury. Normal range of motion. Cervical back: Normal range of motion and neck supple. Right lower leg: No edema. Left lower leg: No edema. Comments: RT anterior knee is TTP throughout with pain on all ROM of the knee. Negative Lachman's, Apley's, Kaleigh's, Drawer sign, and Valgum/Varum Stretch. Lymphadenopathy:      Cervical: No cervical adenopathy. Skin:     General: Skin is warm and dry. Findings: No rash.    Neurological:      Mental Status: She is alert and oriented to person, place, and time. Psychiatric:         Behavior: Behavior normal.         Thought Content: Thought content normal.         Judgment: Judgment normal.       Diagnostic Studies   RESULTS:    XR KNEE RT 3 V   Final Result   1. No acute pathology appreciated in the right knee. Labs Reviewed - No data to display    No results found for this or any previous visit (from the past 12 hour(s)). MEDICATIONS GIVEN:  Medications   ketorolac (TORADOL) injection 15 mg (15 mg IntraMUSCular Given 10/14/22 1094)       Procedures  Procedures    Impression / ED Course / Medical Decision Making   MDM  Number of Diagnoses or Management Options  Knee strain, right, initial encounter: minor  Diagnosis management comments: DDx: knee pain, knee strain, knee contusion, knee fracture, knee effusion    Impression: RT knee strain    Management Plan: Evaluate and treat RT knee pain. Knee XR here. Knee immobilizer applied. Prescibe Naprosyn and Robaxin. Refer to Orthopedics. Patient was in agreement with discharge plan and discharged in good condition. Amount and/or Complexity of Data Reviewed  Tests in the radiology section of CPT®: ordered and reviewed    Risk of Complications, Morbidity, and/or Mortality  Presenting problems: low  Diagnostic procedures: low  Management options: low    Patient Progress  Patient progress: stable      PROGRESS NOTE:   11:10 AM   Initial assessment completed. SPLINT ASSESSMENT:  RT knee immobilizer Splint was correctly applied to the RT knee by RN. Splint is in a good position. Pulse, motor and sensation were intact before and after splint was applied. DISCHARGE NOTE:  12:25 PM   Kerwin Pena's  results have been reviewed with her. She has been counseled regarding her diagnosis, treatment, and plan. She verbally conveys understanding and agreement of the signs, symptoms, diagnosis, treatment and prognosis and additionally agrees to follow up as discussed.   She also agrees with the care-plan and conveys that all of her questions have been answered. I have also provided discharge instructions for her that include: educational information regarding their diagnosis and treatment, and list of reasons why they would want to return to the ED prior to their follow-up appointment, should her condition change. Final Diagnosis     1. Knee strain, right, initial encounter        Disposition     Current Discharge Medication List        START taking these medications    Details   naproxen (Naprosyn) 500 mg tablet Take 1 Tablet by mouth two (2) times daily (with meals) for 10 days. Qty: 20 Tablet, Refills: 0  Start date: 10/14/2022, End date: 10/24/2022      methocarbamoL (Robaxin-750) 750 mg tablet Take 1 Tablet by mouth three (3) times daily for 10 days. Qty: 30 Tablet, Refills: 0  Start date: 10/14/2022, End date: 10/24/2022              Follow-up Information       Follow up With Specialties Details Why Contact Info    [facility] [place]  Go in 1 week If symptoms worsen [address]  [phone]               Nursing notes have been reviewed by the physician/ advanced practice    Clinician.     Rj Dueñas PA-C  October 14, 2022

## 2022-10-25 ENCOUNTER — OFFICE VISIT (OUTPATIENT)
Dept: ORTHOPEDIC SURGERY | Age: 42
End: 2022-10-25
Payer: MEDICAID

## 2022-10-25 VITALS — WEIGHT: 149.6 LBS | BODY MASS INDEX: 30.16 KG/M2 | TEMPERATURE: 97.3 F | HEIGHT: 59 IN

## 2022-10-25 DIAGNOSIS — M22.2X1 PATELLOFEMORAL PAIN SYNDROME OF RIGHT KNEE: ICD-10-CM

## 2022-10-25 DIAGNOSIS — M76.51 PATELLAR TENDINITIS OF RIGHT KNEE: Primary | ICD-10-CM

## 2022-10-25 PROCEDURE — 99203 OFFICE O/P NEW LOW 30 MIN: CPT | Performed by: ORTHOPAEDIC SURGERY

## 2022-10-25 RX ORDER — MELOXICAM 15 MG/1
15 TABLET ORAL
Qty: 30 TABLET | Refills: 1 | Status: SHIPPED | OUTPATIENT
Start: 2022-10-25 | End: 2022-11-01 | Stop reason: SDUPTHER

## 2022-10-25 NOTE — PROGRESS NOTES
Adela Pandya  1980   Chief Complaint   Patient presents with    Joint Pain    Knee Pain        HISTORY OF PRESENT ILLNESS  Adela Pandya is a 38 y.o. female who presents today for evaluation of right knee pain. She rates her pain 10/10 today. Pain has been present for awhile. Went to the ED on 10/14/2022 c/o right knee pain. No specific injury. She notes she has experienced night pain and difficulty with sleeping. She reports some pain with lifting the leg while trying to get into the car and with dressing herself. Pain is centrally located. She presents today wearing a knee brace. Has tried following treatments: Injections:NO; Brace:YES; Therapy:NO; Cane/Crutch:NO       No Known Allergies     Past Medical History:   Diagnosis Date    Anemia     Depression     Headache     Hypertension       Social History     Socioeconomic History    Marital status: SINGLE     Spouse name: Not on file    Number of children: Not on file    Years of education: Not on file    Highest education level: Not on file   Occupational History    Not on file   Tobacco Use    Smoking status: Some Days     Types: Cigarettes     Last attempt to quit: 11/15/2003     Years since quittin.9    Smokeless tobacco: Never   Substance and Sexual Activity    Alcohol use: No    Drug use: Yes     Types: Marijuana    Sexual activity: Not Currently     Partners: Male     Birth control/protection: None   Other Topics Concern    Not on file   Social History Narrative    Not on file     Social Determinants of Health     Financial Resource Strain: Not on file   Food Insecurity: Not on file   Transportation Needs: Not on file   Physical Activity: Not on file   Stress: Not on file   Social Connections: Not on file   Intimate Partner Violence: Not on file   Housing Stability: Not on file      History reviewed. No pertinent surgical history.    Family History   Problem Relation Age of Onset    Hypertension Mother     OSTEOARTHRITIS Mother OSTEOARTHRITIS Father     Diabetes Father       Current Outpatient Medications   Medication Sig    escitalopram oxalate (LEXAPRO) 5 mg tablet TAKE 1 TABLET BY MOUTH DAILY    valACYclovir (VALTREX) 1 gram tablet Take 1 Tablet by mouth in the morning. hydrocortisone (HYTONE) 2.5 % ointment Apply  to affected area two (2) times a day. No current facility-administered medications for this visit. REVIEW OF SYSTEM   Patient denies: Weight loss, Fever/Chills, HA, Visual changes, Fatigue, Chest pain, SOB, Abdominal pain, N/V/D/C, Blood in stool or urine, Edema. Pertinent positive as above in HPI. All others were negative    PHYSICAL EXAM:   Visit Vitals  Temp 97.3 °F (36.3 °C) (Temporal)   Ht 4' 11\" (1.499 m)   Wt 149 lb 9.6 oz (67.9 kg)   LMP 10/01/2022 (Approximate)   BMI 30.22 kg/m²     The patient is a well-developed, well-nourished female   in no acute distress. The patient is alert and oriented times three. The patient is alert and oriented times three. Mood and affect are normal.  LYMPHATIC: lymph nodes are not enlarged and are within normal limits  SKIN: normal in color and non tender to palpation. There are no bruises or abrasions noted. NEUROLOGICAL: Motor sensory exam is within normal limits. Reflexes are equal bilaterally.  There is normal sensation to pinprick and light touch  MUSCULOSKELETAL:  Examination Right knee   Skin Intact   Range of motion 20-60   Effusion +   Medial joint line tenderness +   Lateral joint line tenderness -   Tenderness Pes Bursa -   Tenderness insertion MCL -   Tenderness insertion LCL -   Kaleighs -   Patella crepitus -   Patella grind -   Lachman -   Pivot shift -   Anterior drawer -   Posterior drawer -   Varus stress -   Valgus stress -   Neurovascular Intact   Calf Swelling and Tenderness to Palpation -   Aftab's Test -   Hamstring Cord Tightness -       IMAGING: XR of the right knee with 3 views obtained at [facility] dated 10/14/2022 was reviewed and read by Dr. Viramontes Delay:   IMPRESSION  1. No acute pathology appreciated in the right knee. IMPRESSION:      ICD-10-CM ICD-9-CM    1. Patellar tendinitis of right knee  M76.51 726.64 MRI KNEE RT WO CONT      REFERRAL TO PHYSICAL THERAPY      2. Patellofemoral pain syndrome of right knee  M22.2X1 719.46 MRI KNEE RT WO CONT      REFERRAL TO PHYSICAL THERAPY           PLAN:  1. Pt presents today with right knee pain c/w patellar tendinitis and patellofemoral syndrome. Will refer to PT and will also be ordering MRI to r/o meniscus tear. Was also prescribed Mobic. Patient was provided with physician-directed home exercise program in the office today. Risk factors include: htn  2. No ultrasound exam indicated today  3. No cortisone injection indicated today   4. Yes Physical/Occupational Therapy indicated today  5. Yes diagnostic test indicated today: MRI R KNEE   6. No durable medical equipment indicated today  7. No referral indicated today   8. Yes medications indicated today: MOBIC  9. No Narcotic indicated today       RTC following MRI      Scribed by Damian Cardoso) as dictated by Tad London MD    I, Dr. Tad London, confirm that all documentation is accurate.     Tad London M.D.   [facility]

## 2022-10-26 ENCOUNTER — TELEPHONE (OUTPATIENT)
Dept: PHYSICAL THERAPY | Age: 42
End: 2022-10-26

## 2022-11-01 DIAGNOSIS — M22.2X1 PATELLOFEMORAL PAIN SYNDROME OF RIGHT KNEE: ICD-10-CM

## 2022-11-01 DIAGNOSIS — M76.51 PATELLAR TENDINITIS OF RIGHT KNEE: ICD-10-CM

## 2022-11-01 RX ORDER — MELOXICAM 15 MG/1
15 TABLET ORAL
Qty: 90 TABLET | Refills: 0 | Status: SHIPPED | OUTPATIENT
Start: 2022-11-01

## 2022-11-01 NOTE — TELEPHONE ENCOUNTER
Per pharmacy, patient is requesting a 90 ds    Requested Prescriptions     Pending Prescriptions Disp Refills    meloxicam (Mobic) 15 mg tablet 90 Tablet 0     Sig: Take 1 Tablet by mouth daily (with breakfast). For [facility] in place:   Recommendation Provided To:    Intervention Detail: New Rx: 1, reason: Improve Adherence and Patient Preference  Gap Closed?:   Intervention Accepted By:   Time Spent (min): 5

## 2022-11-04 ENCOUNTER — APPOINTMENT (OUTPATIENT)
Dept: PHYSICAL THERAPY | Age: 42
End: 2022-11-04
Attending: ORTHOPAEDIC SURGERY

## 2022-11-10 ENCOUNTER — TELEPHONE (OUTPATIENT)
Dept: PHYSICAL THERAPY | Age: 42
End: 2022-11-10

## 2022-11-18 ENCOUNTER — APPOINTMENT (OUTPATIENT)
Dept: PHYSICAL THERAPY | Age: 42
End: 2022-11-18
Attending: ORTHOPAEDIC SURGERY

## 2022-12-02 DIAGNOSIS — M22.2X1 PATELLOFEMORAL PAIN SYNDROME OF RIGHT KNEE: ICD-10-CM

## 2022-12-02 DIAGNOSIS — M76.51 PATELLAR TENDINITIS OF RIGHT KNEE: ICD-10-CM

## 2022-12-05 RX ORDER — MELOXICAM 15 MG/1
TABLET ORAL
Qty: 90 TABLET | Refills: 0 | Status: SHIPPED | OUTPATIENT
Start: 2022-12-05

## 2023-01-31 DIAGNOSIS — Z12.31 VISIT FOR SCREENING MAMMOGRAM: Primary | ICD-10-CM

## 2023-02-01 DIAGNOSIS — Z12.31 VISIT FOR SCREENING MAMMOGRAM: Primary | ICD-10-CM

## 2023-02-03 DIAGNOSIS — Z12.31 VISIT FOR SCREENING MAMMOGRAM: Primary | ICD-10-CM

## 2023-05-02 ENCOUNTER — HOSPITAL ENCOUNTER (OUTPATIENT)
Facility: HOSPITAL | Age: 43
Discharge: HOME | End: 2023-05-05
Payer: MEDICAID

## 2023-05-02 DIAGNOSIS — Z12.31 VISIT FOR SCREENING MAMMOGRAM: ICD-10-CM

## 2023-05-02 PROCEDURE — 77067 SCR MAMMO BI INCL CAD: CPT

## 2023-07-11 ENCOUNTER — OFFICE VISIT (OUTPATIENT)
Facility: CLINIC | Age: 43
End: 2023-07-11

## 2023-07-11 VITALS
DIASTOLIC BLOOD PRESSURE: 81 MMHG | WEIGHT: 173 LBS | OXYGEN SATURATION: 99 % | HEIGHT: 59 IN | RESPIRATION RATE: 18 BRPM | BODY MASS INDEX: 34.88 KG/M2 | HEART RATE: 74 BPM | SYSTOLIC BLOOD PRESSURE: 140 MMHG

## 2023-07-11 DIAGNOSIS — M25.562 PAIN IN BOTH KNEES, UNSPECIFIED CHRONICITY: ICD-10-CM

## 2023-07-11 DIAGNOSIS — F32.A DEPRESSION, UNSPECIFIED DEPRESSION TYPE: Primary | ICD-10-CM

## 2023-07-11 DIAGNOSIS — S63.642A SPRAIN OF METACARPOPHALANGEAL JOINT OF LEFT THUMB, INITIAL ENCOUNTER: ICD-10-CM

## 2023-07-11 DIAGNOSIS — M25.561 PAIN IN BOTH KNEES, UNSPECIFIED CHRONICITY: ICD-10-CM

## 2023-07-11 DIAGNOSIS — Z13.1 SCREENING FOR DIABETES MELLITUS: ICD-10-CM

## 2023-07-11 DIAGNOSIS — Z13.220 SCREENING FOR CHOLESTEROL LEVEL: ICD-10-CM

## 2023-07-11 RX ORDER — ESCITALOPRAM OXALATE 5 MG/1
5 TABLET ORAL DAILY
Qty: 30 TABLET | Status: CANCELLED | OUTPATIENT
Start: 2023-07-11

## 2023-07-11 SDOH — ECONOMIC STABILITY: HOUSING INSECURITY
IN THE LAST 12 MONTHS, WAS THERE A TIME WHEN YOU DID NOT HAVE A STEADY PLACE TO SLEEP OR SLEPT IN A SHELTER (INCLUDING NOW)?: NO

## 2023-07-11 SDOH — ECONOMIC STABILITY: FOOD INSECURITY: WITHIN THE PAST 12 MONTHS, YOU WORRIED THAT YOUR FOOD WOULD RUN OUT BEFORE YOU GOT MONEY TO BUY MORE.: NEVER TRUE

## 2023-07-11 SDOH — ECONOMIC STABILITY: FOOD INSECURITY: WITHIN THE PAST 12 MONTHS, THE FOOD YOU BOUGHT JUST DIDN'T LAST AND YOU DIDN'T HAVE MONEY TO GET MORE.: NEVER TRUE

## 2023-07-11 SDOH — ECONOMIC STABILITY: INCOME INSECURITY: HOW HARD IS IT FOR YOU TO PAY FOR THE VERY BASICS LIKE FOOD, HOUSING, MEDICAL CARE, AND HEATING?: SOMEWHAT HARD

## 2023-07-11 ASSESSMENT — PATIENT HEALTH QUESTIONNAIRE - PHQ9
8. MOVING OR SPEAKING SO SLOWLY THAT OTHER PEOPLE COULD HAVE NOTICED. OR THE OPPOSITE, BEING SO FIGETY OR RESTLESS THAT YOU HAVE BEEN MOVING AROUND A LOT MORE THAN USUAL: 0
SUM OF ALL RESPONSES TO PHQ QUESTIONS 1-9: 0
SUM OF ALL RESPONSES TO PHQ QUESTIONS 1-9: 0
2. FEELING DOWN, DEPRESSED OR HOPELESS: 0
7. TROUBLE CONCENTRATING ON THINGS, SUCH AS READING THE NEWSPAPER OR WATCHING TELEVISION: 0
3. TROUBLE FALLING OR STAYING ASLEEP: 0
6. FEELING BAD ABOUT YOURSELF - OR THAT YOU ARE A FAILURE OR HAVE LET YOURSELF OR YOUR FAMILY DOWN: 0
10. IF YOU CHECKED OFF ANY PROBLEMS, HOW DIFFICULT HAVE THESE PROBLEMS MADE IT FOR YOU TO DO YOUR WORK, TAKE CARE OF THINGS AT HOME, OR GET ALONG WITH OTHER PEOPLE: 0
SUM OF ALL RESPONSES TO PHQ9 QUESTIONS 1 & 2: 0
1. LITTLE INTEREST OR PLEASURE IN DOING THINGS: 0
9. THOUGHTS THAT YOU WOULD BE BETTER OFF DEAD, OR OF HURTING YOURSELF: 0
5. POOR APPETITE OR OVEREATING: 0
4. FEELING TIRED OR HAVING LITTLE ENERGY: 0
SUM OF ALL RESPONSES TO PHQ QUESTIONS 1-9: 0
SUM OF ALL RESPONSES TO PHQ QUESTIONS 1-9: 0

## 2023-07-11 ASSESSMENT — ANXIETY QUESTIONNAIRES
2. NOT BEING ABLE TO STOP OR CONTROL WORRYING: 0
4. TROUBLE RELAXING: 0
GAD7 TOTAL SCORE: 0
IF YOU CHECKED OFF ANY PROBLEMS ON THIS QUESTIONNAIRE, HOW DIFFICULT HAVE THESE PROBLEMS MADE IT FOR YOU TO DO YOUR WORK, TAKE CARE OF THINGS AT HOME, OR GET ALONG WITH OTHER PEOPLE: NOT DIFFICULT AT ALL
1. FEELING NERVOUS, ANXIOUS, OR ON EDGE: 0
5. BEING SO RESTLESS THAT IT IS HARD TO SIT STILL: 0
6. BECOMING EASILY ANNOYED OR IRRITABLE: 0
7. FEELING AFRAID AS IF SOMETHING AWFUL MIGHT HAPPEN: 0
3. WORRYING TOO MUCH ABOUT DIFFERENT THINGS: 0

## 2024-04-23 ENCOUNTER — TRANSCRIBE ORDERS (OUTPATIENT)
Facility: HOSPITAL | Age: 44
End: 2024-04-23

## 2024-04-23 DIAGNOSIS — Z12.31 VISIT FOR SCREENING MAMMOGRAM: Primary | ICD-10-CM
